# Patient Record
Sex: FEMALE | Race: BLACK OR AFRICAN AMERICAN | Employment: FULL TIME | URBAN - METROPOLITAN AREA
[De-identification: names, ages, dates, MRNs, and addresses within clinical notes are randomized per-mention and may not be internally consistent; named-entity substitution may affect disease eponyms.]

---

## 2019-04-30 ENCOUNTER — APPOINTMENT (OUTPATIENT)
Dept: ULTRASOUND IMAGING | Age: 34
End: 2019-04-30
Payer: COMMERCIAL

## 2019-04-30 ENCOUNTER — APPOINTMENT (OUTPATIENT)
Dept: GENERAL RADIOLOGY | Age: 34
End: 2019-04-30
Payer: COMMERCIAL

## 2019-04-30 ENCOUNTER — HOSPITAL ENCOUNTER (EMERGENCY)
Age: 34
Discharge: HOME | End: 2019-04-30
Payer: COMMERCIAL

## 2019-04-30 VITALS
OXYGEN SATURATION: 98 % | HEART RATE: 77 BPM | TEMPERATURE: 98.3 F | WEIGHT: 280 LBS | RESPIRATION RATE: 18 BRPM | BODY MASS INDEX: 48.06 KG/M2 | SYSTOLIC BLOOD PRESSURE: 134 MMHG | DIASTOLIC BLOOD PRESSURE: 77 MMHG

## 2019-04-30 DIAGNOSIS — R21 RASH AND OTHER NONSPECIFIC SKIN ERUPTION: ICD-10-CM

## 2019-04-30 DIAGNOSIS — M79.662 PAIN AND SWELLING OF LEFT LOWER LEG: Primary | ICD-10-CM

## 2019-04-30 DIAGNOSIS — M79.89 PAIN AND SWELLING OF LEFT LOWER LEG: Primary | ICD-10-CM

## 2019-04-30 PROCEDURE — 99284 EMERGENCY DEPT VISIT MOD MDM: CPT

## 2019-04-30 PROCEDURE — 93971 EXTREMITY STUDY: CPT

## 2019-04-30 PROCEDURE — 73610 X-RAY EXAM OF ANKLE: CPT

## 2019-04-30 ASSESSMENT — PAIN DESCRIPTION - LOCATION: LOCATION: ANKLE

## 2019-04-30 ASSESSMENT — PAIN SCALES - GENERAL: PAINLEVEL_OUTOF10: 3

## 2019-04-30 ASSESSMENT — PAIN DESCRIPTION - PAIN TYPE: TYPE: ACUTE PAIN

## 2019-04-30 ASSESSMENT — PAIN DESCRIPTION - ORIENTATION: ORIENTATION: LEFT

## 2019-04-30 NOTE — ED PROVIDER NOTES
1 tablet by mouth 3 times daily as needed for Muscle spasms 15 tablet 0    naproxen (NAPROSYN) 500 MG tablet Take 1 tablet by mouth 2 times daily as needed for Pain 20 tablet 0       ALLERGIES    No Known Allergies    SOCIAL & FAMILY HISTORY    Social History     Socioeconomic History    Marital status:      Spouse name: None    Number of children: None    Years of education: None    Highest education level: None   Occupational History    None   Social Needs    Financial resource strain: None    Food insecurity:     Worry: None     Inability: None    Transportation needs:     Medical: None     Non-medical: None   Tobacco Use    Smoking status: Never Smoker   Substance and Sexual Activity    Alcohol use: Yes     Alcohol/week: 0.0 oz     Types: 7 - 10 Cans of beer per week     Comment: occasionally    Drug use: No    Sexual activity: None   Lifestyle    Physical activity:     Days per week: None     Minutes per session: None    Stress: None   Relationships    Social connections:     Talks on phone: None     Gets together: None     Attends Judaism service: None     Active member of club or organization: None     Attends meetings of clubs or organizations: None     Relationship status: None    Intimate partner violence:     Fear of current or ex partner: None     Emotionally abused: None     Physically abused: None     Forced sexual activity: None   Other Topics Concern    None   Social History Narrative    None     History reviewed. No pertinent family history. PHYSICAL EXAM    VITAL SIGNS: /77   Pulse 77   Temp 98.3 °F (36.8 °C)   Resp 18   Wt 280 lb (127 kg)   LMP 04/10/2019   SpO2 98%   BMI 48.06 kg/m²   Constitutional:  Well developed, appears comfortable  HENT:  Atraumatic  Respiratory:  Nonlabored breathing  Musculoskeletal: The Left ankle and distal left lower leg demonstrates generalized soft tissue swelling. No gross deformity. + rash- see integumentary.  There is mild diffuse tenderness to palpation just superior to the malleoli without any point tenderness present. No tenderness palpation of her malleoli Full active range of motion of left ankle without pain. The ankle joint is stable. Achilles tendon clinically intact. Affected extremity with 5/5 strength and distally neurovascularly intact. No swelling, discoloration, or tenderness to palpation of the mid to proximal lower left leg bones, foot bones/toes. + TTP of distal left lower leg. No left calf or thigh tenderness to palpation. No left fibular head tenderness. Compartments are soft in left lower extremity. Vascular:  DP pulse 2+, capillary refill intact. Integument:  No open wounds. Macular erythematous rash present in linear streaks over medial aspect of left ankle that does not kimberley. No streaking erythema. No petechiae or purpura. No induration. No crepitus. No bullae or vesicles. Negative Nikolsky sign. Neurologic:  Awake alert, no slurred speech. Strength 5 out of 5 in left lower actually. Left lower extremity is distally neurovascularly intact. RADIOLOGY   VL DUP LOWER EXTREMITY VENOUS LEFT   Final Result   No evidence of DVT in the left lower extremity. XR ANKLE LEFT (MIN 3 VIEWS)   Final Result   1. Nonspecific diffuse distal lower leg and ankle edematous changes. 2. Normal left ankle alignment with no acute abnormality. ED COURSE & MEDICAL DECISION MAKING       Vital signs and nursing notes reviewed during ED course. I have independently evaluated this patient. Supervising physician present in the Emergency Department, available for consultation, throughout entirety of patient care. All pertinent Lab data and radiographic results reviewed with patient at bedside. Patient presents with rash of left ankle region as well as pain and swelling of left lower leg.  While in the ED today left ankle x-ray was obtained and reveals nonspecific diffuse distal lower leg and ankle edematous changes without acute osseous abnormality. Venous duplex ultrasound obtained today to rule out DVT and reveals no evidence of DVT in the left lower extremity. Patient's left flexion is distally neurovascularly intact and compartments are soft in this extremity. Patient reports that she has been standing a lot frequently for work many hours in a row and is concerned this could be causing swelling. This could be dependent edema present. Unsure of exact etiology of rash but rash does not appear consistent with fungal infection, bacterial infection, tinea, SJS, TEN. Rash could be a plant dermatitis. Will trial steroids for rash. Patient discharged home with prescriptions for compression stockings which we discussed usage of today, and hydrocortisone-we discussed medication. I recommended rest and elevation to patient. Affected extremity is distally neurovascularly intact on exam today and I have low clinical suspicion for vascular or nerve injury on exam today. Patient is nontoxic appearing. Vital signs are stable. Patient is stable for outpatient management. Patient comfortable with discharge at this time. The patient and/or the family were informed of the results of any tests/labs/imaging, the treatment plan, and time was allotted to answer questions. Diagnosis and plan discussed in detail with patient who understands and agrees. Patient is comfortable with discharge at this time. Patient understands and agrees to follow up with PCP in 2-3 days for recheck, and follow up with dermatologist in 5-7 days for recheck if symptoms do not improve in that time. Patient understands and agrees to return emergency department if symptoms worsen or any new symptoms develop. Clinical  IMPRESSION    1. Pain and swelling of left lower leg    2.  Rash and other nonspecific skin eruption         Disposition referral (if applicable):  Kasi Martinez MD  [id number]

## 2021-03-30 ENCOUNTER — HOSPITAL ENCOUNTER (OUTPATIENT)
Age: 36
Discharge: HOME | End: 2021-03-30
Payer: COMMERCIAL

## 2021-03-30 ENCOUNTER — HOSPITAL ENCOUNTER (OUTPATIENT)
Dept: GENERAL RADIOLOGY | Age: 36
Discharge: HOME | End: 2021-03-30
Payer: COMMERCIAL

## 2021-03-30 DIAGNOSIS — J18.9 PNEUMONIA OF LEFT LUNG DUE TO INFECTIOUS ORGANISM, UNSPECIFIED PART OF LUNG: ICD-10-CM

## 2021-03-30 DIAGNOSIS — J18.9 PNEUMONIA OF LEFT LUNG DUE TO INFECTIOUS ORGANISM, UNSPECIFIED PART OF LUNG: Primary | ICD-10-CM

## 2021-03-30 PROCEDURE — 71046 X-RAY EXAM CHEST 2 VIEWS: CPT

## 2023-03-25 ENCOUNTER — HOSPITAL ENCOUNTER (EMERGENCY)
Age: 38
Discharge: HOME | End: 2023-03-25
Attending: EMERGENCY MEDICINE
Payer: COMMERCIAL

## 2023-03-25 VITALS
WEIGHT: 290 LBS | HEART RATE: 101 BPM | BODY MASS INDEX: 49.51 KG/M2 | RESPIRATION RATE: 18 BRPM | SYSTOLIC BLOOD PRESSURE: 129 MMHG | TEMPERATURE: 98 F | HEIGHT: 64 IN | DIASTOLIC BLOOD PRESSURE: 97 MMHG | OXYGEN SATURATION: 98 %

## 2023-03-25 DIAGNOSIS — M54.50 RIGHT-SIDED LOW BACK PAIN WITHOUT SCIATICA, UNSPECIFIED CHRONICITY: Primary | ICD-10-CM

## 2023-03-25 DIAGNOSIS — M25.551 RIGHT HIP PAIN: ICD-10-CM

## 2023-03-25 PROCEDURE — 99283 EMERGENCY DEPT VISIT LOW MDM: CPT

## 2023-03-25 RX ORDER — METHOCARBAMOL 500 MG/1
500 TABLET, FILM COATED ORAL 4 TIMES DAILY
Qty: 40 TABLET | Refills: 0 | Status: SHIPPED | OUTPATIENT
Start: 2023-03-25 | End: 2023-04-04

## 2023-03-25 RX ORDER — LIDOCAINE 50 MG/G
1 PATCH TOPICAL DAILY
Qty: 30 PATCH | Refills: 0 | Status: SHIPPED | OUTPATIENT
Start: 2023-03-25 | End: 2023-04-24

## 2023-03-25 RX ORDER — NAPROXEN 500 MG/1
500 TABLET ORAL 2 TIMES DAILY PRN
Qty: 20 TABLET | Refills: 0 | Status: SHIPPED | OUTPATIENT
Start: 2023-03-25 | End: 2023-04-04

## 2023-03-25 ASSESSMENT — PAIN DESCRIPTION - DESCRIPTORS: DESCRIPTORS: ACHING

## 2023-03-25 ASSESSMENT — PAIN DESCRIPTION - FREQUENCY: FREQUENCY: CONTINUOUS

## 2023-03-25 ASSESSMENT — PAIN DESCRIPTION - ONSET: ONSET: GRADUAL

## 2023-03-25 ASSESSMENT — PAIN DESCRIPTION - ORIENTATION: ORIENTATION: RIGHT

## 2023-03-25 ASSESSMENT — PAIN DESCRIPTION - PAIN TYPE: TYPE: ACUTE PAIN

## 2023-03-25 ASSESSMENT — PAIN DESCRIPTION - LOCATION: LOCATION: HIP

## 2023-03-25 ASSESSMENT — PAIN SCALES - GENERAL: PAINLEVEL_OUTOF10: 6

## 2023-03-25 NOTE — ED PROVIDER NOTES
Emergency Department Encounter    Patient: Indra Herrera  MRN: [id number]  : 1985  Date of Evaluation: 3/25/2023  ED Provider:  River Chua MD    Triage Chief Complaint:   Leg Pain (Right leg pain )    [place]:  Indra Herrera is a 40 y.o. female that presents with complaint of right leg pain. Had a fall down stairs last year,had similar pain and then it did get better. 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 Does heavy lifting/pushing at work a lot. Hurts primarily at the hip and soreness over the upper thigh. Better with activity, worse with rest. Ibuprofen and advil help a lot for it. Able to walk around. Worse at night. No weakness or numbness in lower extremities. Pain is fairly continuous when she is resting. ROS - see HPI, below listed is current ROS at time of my eval:  4 systems reviewed and negative except as above. No past medical history on file. Past Surgical History:   Procedure Laterality Date    TUBAL LIGATION       No family history on file. Social History     Socioeconomic History    Marital status:      Spouse name: Not on file    Number of children: Not on file    Years of education: Not on file    Highest education level: Not on file   Occupational History    Not on file   Tobacco Use    Smoking status: Never    Smokeless tobacco: Not on file   Substance and Sexual Activity    Alcohol use:  Yes     Alcohol/week: 0.0 standard drinks     Types: 7 - 10 Cans of beer per week     Comment: occasionally    Drug use: No    Sexual activity: Not on file   Other Topics Concern    Not on file   Social History Narrative    Not on file     Social Determinants of Health     Financial Resource Strain: Not on file   Food Insecurity: Not on file   Transportation Needs: Not on file   Physical Activity: Not on file   Stress: Not on file   Social Connections: Not on file   Intimate Partner Violence: Not on file   Housing Stability: Not on file     No current facility-administered medications for this encounter. Current Outpatient Medications   Medication Sig Dispense Refill    methocarbamol (ROBAXIN) 500 MG tablet Take 1 tablet by mouth 4 times daily for 10 days 40 tablet 0    naproxen (NAPROSYN) 500 MG tablet Take 1 tablet by mouth 2 times daily as needed for Pain 20 tablet 0    lidocaine (LIDODERM) 5 % Place 1 patch onto the skin daily 12 hours on, 12 hours off. 30 patch 0    Compression Stockings MISC Please dispense one pair of compression stockings to be worn as needed throughout the day for lower extremity swelling. Remove each night before bed. 1 each 0    hydrocortisone 2.5 % cream Apply topically to rash 3 times daily for no longer than 3 weeks. 1 Tube 0    cyclobenzaprine (FLEXERIL) 5 MG tablet Take 1 tablet by mouth 3 times daily as needed for Muscle spasms 15 tablet 0     No Known Allergies    Nursing Notes Reviewed    Physical Exam:  Triage VS:    ED Triage Vitals [03/25/23 0725]   Enc Vitals Group      BP (!) 129/97      Heart Rate (!) 101      Resp 18      Temp 98 °F (36.7 °C)      Temp Source Oral      SpO2 98 %      Weight       Height       Head Circumference       Peak Flow       Pain Score       Pain Loc       Pain Edu? Excl. in [address]? My pulse ox interpretation is - normal    General appearance:  No acute distress. Skin:  Warm. Dry. No rashes or erythema or swelling noted over right low back, hip, thigh or leg. Eye:  Extraocular movements intact. Ears, nose, mouth and throat:  Oral mucosa moist   Neck:  Trachea midline. Extremity:  No swelling over entire right leg and joints. No calf tenderness. Normal ROM at the right hip, knee and ankle. Mild pain with flexing and internally/externally rotating the hip. But minimal tenderness to palpation, more tender to palpation over right lumbar paraspinal muscles. Actually jumped when I was palpating over lower back and piriformis.  No inguinal

## 2024-06-24 ENCOUNTER — OFFICE VISIT MH/BH (OUTPATIENT)
Dept: BARIATRICS/WEIGHT MGMT | Age: 39
End: 2024-06-24
Payer: COMMERCIAL

## 2024-06-24 VITALS
WEIGHT: 293 LBS | BODY MASS INDEX: 53.92 KG/M2 | HEIGHT: 62 IN | HEART RATE: 86 BPM | DIASTOLIC BLOOD PRESSURE: 72 MMHG | SYSTOLIC BLOOD PRESSURE: 120 MMHG | OXYGEN SATURATION: 98 %

## 2024-06-24 DIAGNOSIS — E66.01 MORBID OBESITY WITH BMI OF 50.0-59.9, ADULT (HCC): Primary | ICD-10-CM

## 2024-06-24 DIAGNOSIS — Z79.899 MEDICATION MANAGEMENT: ICD-10-CM

## 2024-06-24 LAB
ALCOHOL URINE: NORMAL
AMPHETAMINE SCREEN, URINE: NEGATIVE
BARBITURATE SCREEN, URINE: NORMAL
BENZODIAZEPINE SCREEN, URINE: NEGATIVE
BUPRENORPHINE URINE: NEGATIVE
COCAINE METABOLITE SCREEN URINE: NEGATIVE
FENTANYL SCREEN, URINE: NORMAL
GABAPENTIN SCREEN, URINE: NORMAL
MDMA URINE: NEGATIVE
METHADONE SCREEN, URINE: NEGATIVE
METHAMPHETAMINE, URINE: NEGATIVE
OPIATE SCREEN URINE: NEGATIVE
OXYCODONE SCREEN URINE: NEGATIVE
PHENCYCLIDINE SCREEN URINE: NEGATIVE
PROPOXYPHENE SCREEN, URINE: NEGATIVE
SYNTHETIC CANNABINOIDS(K2) SCREEN, URINE: NORMAL
THC SCREEN, URINE: NEGATIVE
TRAMADOL SCREEN URINE: NORMAL
TRICYCLIC ANTIDEPRESSANTS, UR: NEGATIVE

## 2024-06-24 PROCEDURE — 99204 OFFICE O/P NEW MOD 45 MIN: CPT | Performed by: NURSE PRACTITIONER

## 2024-06-24 PROCEDURE — G8427 DOCREV CUR MEDS BY ELIG CLIN: HCPCS | Performed by: NURSE PRACTITIONER

## 2024-06-24 PROCEDURE — 1036F TOBACCO NON-USER: CPT | Performed by: NURSE PRACTITIONER

## 2024-06-24 PROCEDURE — 80305 DRUG TEST PRSMV DIR OPT OBS: CPT | Performed by: NURSE PRACTITIONER

## 2024-06-24 PROCEDURE — G8417 CALC BMI ABV UP PARAM F/U: HCPCS | Performed by: NURSE PRACTITIONER

## 2024-06-24 PROCEDURE — 36415 COLL VENOUS BLD VENIPUNCTURE: CPT | Performed by: NURSE PRACTITIONER

## 2024-06-24 NOTE — PROGRESS NOTES
Chief Complaint   Patient presents with   • Weight Management     NP - 1st Med WM Visit         SUBJECTIVE:    HPI: Patient is here with complaints of weight gain and inability to lose weight per self.  Inquiring about weight loss medications to assist with their weight loss goal.    Obesity with a BMI of Body mass index is 55.88 kg/m²..    Patient has failed to lose 5% of body weight for many years.    Any history of glaucoma? DENIES    Any history of drug abuse? DENIES    Any history of CAD, uncontrolled HTN, arrhythmias, stroke, or CHF?? DENIES    Any history of hyperthyroidism? Denies    Any current or recent use of MAOIs? DENIES    Pregnant or breastfeeding?   tubal ligation    Current dietary measures: denies a diet plan     Current exercise measures: limited     I have reviewed the patient's(pertinent information to this visit) medical history, family history(scanned in  the Mediatab under \"patient questioner\"), social history and review of systems with the patient today in the office.          Past Surgical History:   Procedure Laterality Date   • TUBAL LIGATION         History reviewed. No pertinent past medical history.    History reviewed. No pertinent family history.    Social History     Socioeconomic History   • Marital status:      Spouse name: Not on file   • Number of children: Not on file   • Years of education: Not on file   • Highest education level: Not on file   Occupational History   • Not on file   Tobacco Use   • Smoking status: Never   • Smokeless tobacco: Not on file   Substance and Sexual Activity   • Alcohol use: Yes     Alcohol/week: 0.0 standard drinks of alcohol     Types: 7 - 10 Cans of beer per week     Comment: occasionally   • Drug use: No   • Sexual activity: Not on file   Other Topics Concern   • Not on file   Social History Narrative   • Not on file     Social Determinants of Health     Financial Resource Strain: Not on file   Food Insecurity: Not on file

## 2024-06-25 LAB
ALBUMIN SERPL-MCNC: 4.1 G/DL (ref 3.4–5)
ALBUMIN/GLOB SERPL: 1.2 {RATIO} (ref 1.1–2.2)
ALP SERPL-CCNC: 82 U/L (ref 40–129)
ALT SERPL-CCNC: 15 U/L (ref 10–40)
ANION GAP SERPL CALCULATED.3IONS-SCNC: 13 MMOL/L (ref 3–16)
AST SERPL-CCNC: 13 U/L (ref 15–37)
BASOPHILS # BLD: 0 K/UL (ref 0–0.2)
BASOPHILS NFR BLD: 0.3 %
BILIRUB SERPL-MCNC: <0.2 MG/DL (ref 0–1)
BUN SERPL-MCNC: 12 MG/DL (ref 7–20)
CALCIUM SERPL-MCNC: 9.1 MG/DL (ref 8.3–10.6)
CHLORIDE SERPL-SCNC: 104 MMOL/L (ref 99–110)
CO2 SERPL-SCNC: 24 MMOL/L (ref 21–32)
CREAT SERPL-MCNC: 0.7 MG/DL (ref 0.6–1.1)
DEPRECATED RDW RBC AUTO: 14.3 % (ref 12.4–15.4)
EOSINOPHIL # BLD: 0.2 K/UL (ref 0–0.6)
EOSINOPHIL NFR BLD: 2.3 %
EST. AVERAGE GLUCOSE BLD GHB EST-MCNC: 108.3 MG/DL
GFR SERPLBLD CREATININE-BSD FMLA CKD-EPI: >90 ML/MIN/{1.73_M2}
GLUCOSE SERPL-MCNC: 76 MG/DL (ref 70–99)
HBA1C MFR BLD: 5.4 %
HCT VFR BLD AUTO: 37.1 % (ref 36–48)
HGB BLD-MCNC: 12.5 G/DL (ref 12–16)
LYMPHOCYTES # BLD: 2.5 K/UL (ref 1–5.1)
LYMPHOCYTES NFR BLD: 30.2 %
MCH RBC QN AUTO: 28 PG (ref 26–34)
MCHC RBC AUTO-ENTMCNC: 33.6 G/DL (ref 31–36)
MCV RBC AUTO: 83.2 FL (ref 80–100)
MONOCYTES # BLD: 0.4 K/UL (ref 0–1.3)
MONOCYTES NFR BLD: 4.9 %
NEUTROPHILS # BLD: 5.2 K/UL (ref 1.7–7.7)
NEUTROPHILS NFR BLD: 62.3 %
PLATELET # BLD AUTO: 319 K/UL (ref 135–450)
PMV BLD AUTO: 9 FL (ref 5–10.5)
POTASSIUM SERPL-SCNC: 3.6 MMOL/L (ref 3.5–5.1)
PROT SERPL-MCNC: 7.5 G/DL (ref 6.4–8.2)
RBC # BLD AUTO: 4.46 M/UL (ref 4–5.2)
SODIUM SERPL-SCNC: 141 MMOL/L (ref 136–145)
WBC # BLD AUTO: 8.4 K/UL (ref 4–11)

## 2024-07-08 ENCOUNTER — OFFICE VISIT (OUTPATIENT)
Dept: BARIATRICS/WEIGHT MGMT | Age: 39
End: 2024-07-08
Payer: COMMERCIAL

## 2024-07-08 VITALS
HEIGHT: 62 IN | DIASTOLIC BLOOD PRESSURE: 86 MMHG | WEIGHT: 293 LBS | BODY MASS INDEX: 53.92 KG/M2 | HEART RATE: 97 BPM | SYSTOLIC BLOOD PRESSURE: 104 MMHG | OXYGEN SATURATION: 99 %

## 2024-07-08 DIAGNOSIS — E66.01 MORBID OBESITY WITH BMI OF 50.0-59.9, ADULT (HCC): Primary | ICD-10-CM

## 2024-07-08 DIAGNOSIS — Z79.899 MEDICATION MANAGEMENT: ICD-10-CM

## 2024-07-08 PROCEDURE — G8427 DOCREV CUR MEDS BY ELIG CLIN: HCPCS | Performed by: NURSE PRACTITIONER

## 2024-07-08 PROCEDURE — 1036F TOBACCO NON-USER: CPT | Performed by: NURSE PRACTITIONER

## 2024-07-08 PROCEDURE — G8417 CALC BMI ABV UP PARAM F/U: HCPCS | Performed by: NURSE PRACTITIONER

## 2024-07-08 PROCEDURE — 99214 OFFICE O/P EST MOD 30 MIN: CPT | Performed by: NURSE PRACTITIONER

## 2024-07-08 RX ORDER — PHENTERMINE HYDROCHLORIDE 37.5 MG/1
37.5 TABLET ORAL
Qty: 30 TABLET | Refills: 0 | Status: SHIPPED | OUTPATIENT
Start: 2024-07-08 | End: 2024-08-07

## 2024-07-08 NOTE — PROGRESS NOTES
Chief Complaint   Patient presents with    Weight Management     Med WM - Planned Adipex         SUBJECTIVE:    HPI: Patient is here with complaints of weight gain and inability to lose weight per self.  Inquiring about weight loss medications to assist with their weight loss goal.    Obesity with a BMI of Body mass index is 56.23 kg/m²..    Any history of glaucoma? Denies    Any history of drug abuse? Denies    Any history of CAD, uncontrolled HTN, arrhythmias, stroke, or CHF??  Denies    Any history of hyperthyroidism? Denies    Any current or recent use of MAOIs? Denies    Current dietary measures: has not started tracking calories yet    Current exercise measures: limited    I have reviewed the patient's(pertinent information to this visit) medical history, family history(scanned in  the Mediat[employer] under \"patient questioner\"), social history and review of systems with the patient today in the office.          Past Surgical History:   Procedure Laterality Date    TUBAL LIGATION         History reviewed. No pertinent past medical history.    History reviewed. No pertinent family history.    Social History     Socioeconomic History    Marital status:      Spouse name: Not on file    Number of children: Not on file    Years of education: Not on file    Highest education level: Not on file   Occupational History    Not on file   Tobacco Use    Smoking status: Never    Smokeless tobacco: Not on file   Substance and Sexual Activity    Alcohol use: Yes     Alcohol/week: 0.0 standard drinks of alcohol     Types: 7 - 10 Cans of beer per week     Comment: occasionally    Drug use: No    Sexual activity: Not on file   Other Topics Concern    Not on file   Social History Narrative    Not on file     Social Determinants of Health     Financial Resource Strain: Not on file   Food Insecurity: Not on file   Transportation Needs: Not on file   Physical Activity: Not on file   Stress: Not on file   Social Connections: Not on

## 2024-07-31 ENCOUNTER — OFFICE VISIT (OUTPATIENT)
Dept: BARIATRICS/WEIGHT MGMT | Age: 39
End: 2024-07-31

## 2024-07-31 VITALS — WEIGHT: 293 LBS | BODY MASS INDEX: 53.92 KG/M2 | HEIGHT: 62 IN

## 2024-07-31 DIAGNOSIS — E66.01 MORBID OBESITY WITH BMI OF 50.0-59.9, ADULT (HCC): Primary | ICD-10-CM

## 2024-07-31 NOTE — PROGRESS NOTES
Outpatient Nutrition Counseling - Medication Weight Loss Program    REASON FOR VISIT: Medication Program    Chief Complaint:    Chief Complaint   Patient presents with    Weight Management       SUBJECTIVE:  Pt here for nutrition consult in medication program. The patient is a 38 y.o. female being seen for obesity, enrolled in Medication Weight Loss Program; [facility], Height: 158.1 cm (5' 2.25\"), Weight - Scale: 135.4 kg (298 lb 6.4 oz), Current Body mass index is 54.14 kg/m².  patient's PCP is Judd Gilbert DO     Comorbid Conditions:  Significant diseases affecting this patient are No past medical history on file..    Review of Systems - Review of Systems  Otherwise per HPI.    Allergies:  No Known Allergies    Past Surgical History:  Past Surgical History:   Procedure Laterality Date    TUBAL LIGATION         Family History:  No family history on file.    Social History:  Social History     Socioeconomic History    Marital status:      Spouse name: Not on file    Number of children: Not on file    Years of education: Not on file    Highest education level: Not on file   Occupational History    Not on file   Tobacco Use    Smoking status: Never    Smokeless tobacco: Not on file   Substance and Sexual Activity    Alcohol use: Yes     Alcohol/week: 0.0 standard drinks of alcohol     Types: 7 - 10 Cans of beer per week     Comment: occasionally    Drug use: No    Sexual activity: Not on file   Other Topics Concern    Not on file   Social History Narrative    Not on file     Social Determinants of Health     Financial Resource Strain: Not on file   Food Insecurity: Not on file   Transportation Needs: Not on file   Physical Activity: Not on file   Stress: Not on file   Social Connections: Not on file   Intimate Partner Violence: Not on file   Housing Stability: Not on file         OBJECTIVE:  Physical Exam   Ht 1.581 m (5' 2.25\")   Wt 135.4 kg (298 lb 6.4 oz)   BMI 54.14 kg/m²        NUTRITION

## 2024-08-07 ENCOUNTER — OFFICE VISIT (OUTPATIENT)
Dept: BARIATRICS/WEIGHT MGMT | Age: 39
End: 2024-08-07
Payer: COMMERCIAL

## 2024-08-07 VITALS
HEIGHT: 62 IN | BODY MASS INDEX: 53.92 KG/M2 | OXYGEN SATURATION: 98 % | HEART RATE: 90 BPM | DIASTOLIC BLOOD PRESSURE: 60 MMHG | SYSTOLIC BLOOD PRESSURE: 112 MMHG | WEIGHT: 293 LBS

## 2024-08-07 DIAGNOSIS — Z79.899 MEDICATION MANAGEMENT: ICD-10-CM

## 2024-08-07 DIAGNOSIS — E66.01 MORBID OBESITY WITH BMI OF 50.0-59.9, ADULT (HCC): Primary | ICD-10-CM

## 2024-08-07 PROCEDURE — G8417 CALC BMI ABV UP PARAM F/U: HCPCS | Performed by: NURSE PRACTITIONER

## 2024-08-07 PROCEDURE — G8427 DOCREV CUR MEDS BY ELIG CLIN: HCPCS | Performed by: NURSE PRACTITIONER

## 2024-08-07 PROCEDURE — 1036F TOBACCO NON-USER: CPT | Performed by: NURSE PRACTITIONER

## 2024-08-07 PROCEDURE — 99214 OFFICE O/P EST MOD 30 MIN: CPT | Performed by: NURSE PRACTITIONER

## 2024-08-07 RX ORDER — PHENTERMINE HYDROCHLORIDE 37.5 MG/1
37.5 TABLET ORAL
Qty: 30 TABLET | Refills: 0 | Status: SHIPPED | OUTPATIENT
Start: 2024-08-07 | End: 2024-09-06

## 2024-08-07 NOTE — PROGRESS NOTES
Chief Complaint   Patient presents with    Weight Management     F/U Medication WM Visit - Adipex #2  Has Bar Cov         SUBJECTIVE:    HPI: Patient is here with complaints of: Monthly Adipex visit.    Obesity with a BMI of Body mass index is 54.23 kg/m²..    Current weight: 298 pounds    Weight change since last visit: -11 pounds (if pt failed to lose weight, cannot remain on medication).    Month 2 of being on Adipex.     Any new absolute contraindications (glaucoma, drug abuse, CAD, uncontrolled HTN, arrhythmias, stroke, CHF, hyperthyroidism, MAOI use, pregnant or breastfeeding) to being on medication: DENIES     Pt complains of the following side effects: DENIES    Current dietary measures: portion control and healthier food options    Current exercise measures: walking 1-2 miles daily    I have reviewed the patient's(pertinent information to this visit) medical history, family history(scanned in  the Mediatab under \"patient questioner\"), social history and review of systems with the patient today in the office.          Past Surgical History:   Procedure Laterality Date    TUBAL LIGATION         History reviewed. No pertinent past medical history.    History reviewed. No pertinent family history.    Social History     Socioeconomic History    Marital status:      Spouse name: Not on file    Number of children: Not on file    Years of education: Not on file    Highest education level: Not on file   Occupational History    Not on file   Tobacco Use    Smoking status: Never    Smokeless tobacco: Not on file   Substance and Sexual Activity    Alcohol use: Yes     Alcohol/week: 0.0 standard drinks of alcohol     Types: 7 - 10 Cans of beer per week     Comment: occasionally    Drug use: No    Sexual activity: Not on file   Other Topics Concern    Not on file   Social History Narrative    Not on file     Social Determinants of Health     Financial Resource Strain: Not on file   Food Insecurity: Not on file

## 2024-09-09 ENCOUNTER — OFFICE VISIT (OUTPATIENT)
Dept: BARIATRICS/WEIGHT MGMT | Age: 39
End: 2024-09-09
Payer: COMMERCIAL

## 2024-09-09 VITALS
HEIGHT: 62 IN | HEART RATE: 94 BPM | SYSTOLIC BLOOD PRESSURE: 110 MMHG | DIASTOLIC BLOOD PRESSURE: 78 MMHG | WEIGHT: 287.5 LBS | OXYGEN SATURATION: 96 % | BODY MASS INDEX: 52.91 KG/M2

## 2024-09-09 DIAGNOSIS — Z79.899 MEDICATION MANAGEMENT: ICD-10-CM

## 2024-09-09 DIAGNOSIS — E66.01 MORBID OBESITY WITH BMI OF 50.0-59.9, ADULT (HCC): Primary | ICD-10-CM

## 2024-09-09 PROCEDURE — 1036F TOBACCO NON-USER: CPT | Performed by: NURSE PRACTITIONER

## 2024-09-09 PROCEDURE — 99214 OFFICE O/P EST MOD 30 MIN: CPT | Performed by: NURSE PRACTITIONER

## 2024-09-09 PROCEDURE — G8417 CALC BMI ABV UP PARAM F/U: HCPCS | Performed by: NURSE PRACTITIONER

## 2024-09-09 PROCEDURE — G8427 DOCREV CUR MEDS BY ELIG CLIN: HCPCS | Performed by: NURSE PRACTITIONER

## 2024-09-09 RX ORDER — PHENTERMINE HYDROCHLORIDE 37.5 MG/1
37.5 TABLET ORAL
Qty: 30 TABLET | Refills: 0 | Status: SHIPPED | OUTPATIENT
Start: 2024-09-09 | End: 2024-10-09

## 2024-09-09 RX ORDER — SENNOSIDES A AND B 8.6 MG/1
1 TABLET, FILM COATED ORAL 2 TIMES DAILY
Qty: 60 TABLET | Refills: 11 | Status: SHIPPED | OUTPATIENT
Start: 2024-09-09 | End: 2025-09-09

## 2024-09-09 ASSESSMENT — ENCOUNTER SYMPTOMS: CONSTIPATION: 1

## 2024-10-07 ENCOUNTER — OFFICE VISIT (OUTPATIENT)
Dept: BARIATRICS/WEIGHT MGMT | Age: 39
End: 2024-10-07
Payer: COMMERCIAL

## 2024-10-07 VITALS
SYSTOLIC BLOOD PRESSURE: 112 MMHG | HEART RATE: 88 BPM | BODY MASS INDEX: 52.56 KG/M2 | OXYGEN SATURATION: 98 % | WEIGHT: 285.6 LBS | HEIGHT: 62 IN | DIASTOLIC BLOOD PRESSURE: 78 MMHG

## 2024-10-07 DIAGNOSIS — Z79.899 MEDICATION MANAGEMENT: ICD-10-CM

## 2024-10-07 DIAGNOSIS — E66.01 MORBID OBESITY WITH BMI OF 50.0-59.9, ADULT: Primary | ICD-10-CM

## 2024-10-07 PROCEDURE — G8427 DOCREV CUR MEDS BY ELIG CLIN: HCPCS | Performed by: NURSE PRACTITIONER

## 2024-10-07 PROCEDURE — 99214 OFFICE O/P EST MOD 30 MIN: CPT | Performed by: NURSE PRACTITIONER

## 2024-10-07 PROCEDURE — G8417 CALC BMI ABV UP PARAM F/U: HCPCS | Performed by: NURSE PRACTITIONER

## 2024-10-07 PROCEDURE — 1036F TOBACCO NON-USER: CPT | Performed by: NURSE PRACTITIONER

## 2024-10-07 PROCEDURE — G8484 FLU IMMUNIZE NO ADMIN: HCPCS | Performed by: NURSE PRACTITIONER

## 2024-10-07 RX ORDER — PHENTERMINE HYDROCHLORIDE 37.5 MG/1
37.5 TABLET ORAL
Qty: 30 TABLET | Refills: 0 | Status: SHIPPED | OUTPATIENT
Start: 2024-10-07 | End: 2024-11-06

## 2024-10-07 NOTE — PROGRESS NOTES
Restart tracking calories; about  0682-7510 daily  - Limit carb intake/ limit convenience foods  - 64 oz water daily  - Increase activity as able    2. Medication management  - Down 1.9 pounds this month  - Denies any side effects  - Has surpassed the 5% weight loss goal  - RX refill sent for 4th month of Adipex  - RTC one month    - phentermine (ADIPEX-P) 37.5 MG tablet; Take 1 tablet by mouth every morning (before breakfast) for 30 days. BMI 44. Max Daily Amount: 37.5 mg  Dispense: 30 tablet; Refill: 0        PLAN:    -Continue Adipex. RX REFILLED.    -BMI is over 30, or over 27 with weight-related risk factors.     -Patient demonstrated weight loss since last visit.    -Patient aware that weight must be lost at each visit or medication will be discontinued.     - Patient must lose 5% of body weight every 3 months to remain on medication.    -Patient is aware that in order to be successful, must combine Adipex with appropriate diet and exercise plan.     -Pt aware must continue to meet monthly in person while on this medication. F/u in one month.    -Check [place] [employer] Rx Reporting System. Any concerns: NONE Reported     - Current birth control measures include: tubal ligation    -If elderly or renal impairment, will use lower dose (15 mg daily) and avoid all together if GFR is less than 15. N/A      No orders of the defined types were placed in this encounter.       Orders Placed This Encounter   Medications    phentermine (ADIPEX-P) 37.5 MG tablet     Sig: Take 1 tablet by mouth every morning (before breakfast) for 30 days. BMI 44. Max Daily Amount: 37.5 mg     Dispense:  30 tablet     Refill:  0        Follow Up:  Return in about 1 month (around 11/7/2024).      ROXANA Veliz - CNP

## 2024-11-07 ENCOUNTER — OFFICE VISIT (OUTPATIENT)
Dept: BARIATRICS/WEIGHT MGMT | Age: 39
End: 2024-11-07
Payer: COMMERCIAL

## 2024-11-07 VITALS
WEIGHT: 273.9 LBS | OXYGEN SATURATION: 99 % | HEART RATE: 94 BPM | DIASTOLIC BLOOD PRESSURE: 84 MMHG | SYSTOLIC BLOOD PRESSURE: 136 MMHG | HEIGHT: 62 IN | BODY MASS INDEX: 50.4 KG/M2

## 2024-11-07 DIAGNOSIS — Z79.899 MEDICATION MANAGEMENT: ICD-10-CM

## 2024-11-07 DIAGNOSIS — E66.01 MORBID OBESITY WITH BMI OF 45.0-49.9, ADULT: Primary | ICD-10-CM

## 2024-11-07 PROCEDURE — 99214 OFFICE O/P EST MOD 30 MIN: CPT | Performed by: NURSE PRACTITIONER

## 2024-11-07 PROCEDURE — G8417 CALC BMI ABV UP PARAM F/U: HCPCS | Performed by: NURSE PRACTITIONER

## 2024-11-07 PROCEDURE — 1036F TOBACCO NON-USER: CPT | Performed by: NURSE PRACTITIONER

## 2024-11-07 PROCEDURE — G8484 FLU IMMUNIZE NO ADMIN: HCPCS | Performed by: NURSE PRACTITIONER

## 2024-11-07 PROCEDURE — G8427 DOCREV CUR MEDS BY ELIG CLIN: HCPCS | Performed by: NURSE PRACTITIONER

## 2024-11-07 RX ORDER — PHENTERMINE HYDROCHLORIDE 37.5 MG/1
37.5 TABLET ORAL
Qty: 30 TABLET | Refills: 0 | Status: SHIPPED | OUTPATIENT
Start: 2024-11-07 | End: 2024-12-07

## 2024-11-07 ASSESSMENT — PATIENT HEALTH QUESTIONNAIRE - PHQ9
2. FEELING DOWN, DEPRESSED OR HOPELESS: NOT AT ALL
SUM OF ALL RESPONSES TO PHQ QUESTIONS 1-9: 0
1. LITTLE INTEREST OR PLEASURE IN DOING THINGS: NOT AT ALL
SUM OF ALL RESPONSES TO PHQ QUESTIONS 1-9: 0
SUM OF ALL RESPONSES TO PHQ QUESTIONS 1-9: 0
SUM OF ALL RESPONSES TO PHQ9 QUESTIONS 1 & 2: 0
SUM OF ALL RESPONSES TO PHQ QUESTIONS 1-9: 0

## 2024-11-07 NOTE — PROGRESS NOTES
Chief Complaint   Patient presents with    Weight Management     Med WM Adipex #5 Has Bar cov.         SUBJECTIVE:    HPI: Patient is here with complaints of: Monthly Adipex visit.    Obesity with a BMI of Body mass index is 49.7 kg/m²..    Current weight: 273 pounds    Weight change since last visit: -11.7 pounds (if pt failed to lose weight, cannot remain on medication).    Month 5 of being on Adipex.     Any new absolute contraindications (glaucoma, drug abuse, CAD, uncontrolled HTN, arrhythmias, stroke, CHF, hyperthyroidism, MAOI use, pregnant or breastfeeding) to being on medication: DENIES     Pt complains of the following side effects: DENIES    Current dietary measures: tracking calories about 1400 daily; water intake good; protein intake good    Current exercise measures: walking more     I have reviewed the patient's(pertinent information to this visit) medical history, family history(scanned in  the Mediatab under \"patient questioner\"), social history and review of systems with the patient today in the office.          Past Surgical History:   Procedure Laterality Date    TUBAL LIGATION         History reviewed. No pertinent past medical history.    History reviewed. No pertinent family history.    Social History     Socioeconomic History    Marital status:      Spouse name: Not on file    Number of children: Not on file    Years of education: Not on file    Highest education level: Not on file   Occupational History    Not on file   Tobacco Use    Smoking status: Never    Smokeless tobacco: Not on file   Substance and Sexual Activity    Alcohol use: Yes     Alcohol/week: 0.0 standard drinks of alcohol     Types: 7 - 10 Cans of beer per week     Comment: occasionally    Drug use: No    Sexual activity: Not on file   Other Topics Concern    Not on file   Social History Narrative    Not on file     Social Determinants of Health     Financial Resource Strain: Not on file   Food Insecurity: Not on file

## 2024-12-11 ENCOUNTER — OFFICE VISIT (OUTPATIENT)
Dept: BARIATRICS/WEIGHT MGMT | Age: 39
End: 2024-12-11
Payer: COMMERCIAL

## 2024-12-11 VITALS
HEIGHT: 62 IN | SYSTOLIC BLOOD PRESSURE: 124 MMHG | BODY MASS INDEX: 50.14 KG/M2 | HEART RATE: 87 BPM | DIASTOLIC BLOOD PRESSURE: 60 MMHG | WEIGHT: 272.5 LBS | OXYGEN SATURATION: 95 %

## 2024-12-11 DIAGNOSIS — Z79.899 MEDICATION MANAGEMENT: ICD-10-CM

## 2024-12-11 DIAGNOSIS — E66.01 MORBID OBESITY WITH BMI OF 45.0-49.9, ADULT: Primary | ICD-10-CM

## 2024-12-11 PROCEDURE — G8417 CALC BMI ABV UP PARAM F/U: HCPCS | Performed by: NURSE PRACTITIONER

## 2024-12-11 PROCEDURE — G8484 FLU IMMUNIZE NO ADMIN: HCPCS | Performed by: NURSE PRACTITIONER

## 2024-12-11 PROCEDURE — 1036F TOBACCO NON-USER: CPT | Performed by: NURSE PRACTITIONER

## 2024-12-11 PROCEDURE — G8427 DOCREV CUR MEDS BY ELIG CLIN: HCPCS | Performed by: NURSE PRACTITIONER

## 2024-12-11 PROCEDURE — 99214 OFFICE O/P EST MOD 30 MIN: CPT | Performed by: NURSE PRACTITIONER

## 2024-12-11 RX ORDER — PHENTERMINE HYDROCHLORIDE 37.5 MG/1
37.5 TABLET ORAL
Qty: 30 TABLET | Refills: 0 | Status: SHIPPED | OUTPATIENT
Start: 2024-12-11 | End: 2025-01-10

## 2024-12-11 RX ORDER — NAPROXEN 500 MG/1
500 TABLET ORAL 2 TIMES DAILY
COMMUNITY
Start: 2024-11-09

## 2024-12-11 NOTE — PROGRESS NOTES
Chief Complaint   Patient presents with    Weight Management     Medication WM Visit - Adipex #6         SUBJECTIVE:    HPI: Patient is here with complaints of: Monthly Adipex visit.    Obesity with a BMI of Body mass index is 49.44 kg/m²..    Current weight: 272 pounds    Weight change since last visit: -1.4 pounds (if pt failed to lose weight, cannot remain on medication).    Month 6 of being on Adipex.     Any new absolute contraindications (glaucoma, drug abuse, CAD, uncontrolled HTN, arrhythmias, stroke, CHF, hyperthyroidism, MAOI use, pregnant or breastfeeding) to being on medication: DENIES     Pt complains of the following side effects: DENIES    Current dietary measures: tracking calories most days; more carb intake recently than usual; water intake good    Current exercise measures: limited    I have reviewed the patient's(pertinent information to this visit) medical history, family history(scanned in  the Mediatab under \"patient questioner\"), social history and review of systems with the patient today in the office.          Past Surgical History:   Procedure Laterality Date    TUBAL LIGATION         No past medical history on file.    No family history on file.    Social History     Socioeconomic History    Marital status:      Spouse name: Not on file    Number of children: Not on file    Years of education: Not on file    Highest education level: Not on file   Occupational History    Not on file   Tobacco Use    Smoking status: Never    Smokeless tobacco: Not on file   Substance and Sexual Activity    Alcohol use: Yes     Alcohol/week: 0.0 standard drinks of alcohol     Types: 7 - 10 Cans of beer per week     Comment: occasionally    Drug use: No    Sexual activity: Not on file   Other Topics Concern    Not on file   Social History Narrative    Not on file     Social Determinants of Health     Financial Resource Strain: Not on file   Food Insecurity: Not on file   Transportation Needs: Not on

## 2025-01-15 ENCOUNTER — OFFICE VISIT (OUTPATIENT)
Dept: BARIATRICS/WEIGHT MGMT | Age: 40
End: 2025-01-15
Payer: COMMERCIAL

## 2025-01-15 VITALS
SYSTOLIC BLOOD PRESSURE: 120 MMHG | BODY MASS INDEX: 50.64 KG/M2 | OXYGEN SATURATION: 97 % | HEART RATE: 83 BPM | DIASTOLIC BLOOD PRESSURE: 80 MMHG | HEIGHT: 62 IN | WEIGHT: 275.2 LBS

## 2025-01-15 DIAGNOSIS — Z79.899 MEDICATION MANAGEMENT: ICD-10-CM

## 2025-01-15 DIAGNOSIS — E66.01 MORBID OBESITY WITH BMI OF 45.0-49.9, ADULT: Primary | ICD-10-CM

## 2025-01-15 PROCEDURE — 99214 OFFICE O/P EST MOD 30 MIN: CPT | Performed by: NURSE PRACTITIONER

## 2025-01-15 PROCEDURE — G8417 CALC BMI ABV UP PARAM F/U: HCPCS | Performed by: NURSE PRACTITIONER

## 2025-01-15 PROCEDURE — G8427 DOCREV CUR MEDS BY ELIG CLIN: HCPCS | Performed by: NURSE PRACTITIONER

## 2025-01-15 PROCEDURE — 1036F TOBACCO NON-USER: CPT | Performed by: NURSE PRACTITIONER

## 2025-01-15 RX ORDER — PHENTERMINE HYDROCHLORIDE 37.5 MG/1
37.5 TABLET ORAL
Qty: 30 TABLET | Refills: 0 | Status: SHIPPED | OUTPATIENT
Start: 2025-01-15 | End: 2025-02-14

## 2025-01-15 NOTE — PROGRESS NOTES
Chief Complaint   Patient presents with    Weight Management     Med WM Adipex # 7         SUBJECTIVE:    HPI: Patient is here with complaints of: Monthly Adipex visit.    Obesity with a BMI of Body mass index is 49.93 kg/m²..    Current weight: 275 pounds    Weight change since last visit: +2.7 pounds (if pt failed to lose weight, cannot remain on medication).    Month 7 of being on Adipex.     Any new absolute contraindications (glaucoma, drug abuse, CAD, uncontrolled HTN, arrhythmias, stroke, CHF, hyperthyroidism, MAOI use, pregnant or breastfeeding) to being on medication: DENIES     Pt complains of the following side effects: DENIES    Current dietary measures: tracking calories about 1500; protein moderate amount; water intake good; diet off track recently per holiday season    Current exercise measures: limited    I have reviewed the patient's(pertinent information to this visit) medical history, family history(scanned in  the [facility] under \"patient questioner\"), social history and review of systems with the patient today in the office.          Past Surgical History:   Procedure Laterality Date    TUBAL LIGATION         History reviewed. No pertinent past medical history.    History reviewed. No pertinent family history.    Social History     Socioeconomic History    Marital status:      Spouse name: Not on file    Number of children: Not on file    Years of education: Not on file    Highest education level: Not on file   Occupational History    Not on file   Tobacco Use    Smoking status: Never    Smokeless tobacco: Not on file   Substance and Sexual Activity    Alcohol use: Yes     Alcohol/week: 0.0 standard drinks of alcohol     Types: 7 - 10 Cans of beer per week     Comment: occasionally    Drug use: No    Sexual activity: Not on file   Other Topics Concern    Not on file   Social History Narrative    Not on file     Social Determinants of Health     Financial Resource Strain: Not on file   Food

## 2025-01-16 RX ORDER — PHENTERMINE HYDROCHLORIDE 37.5 MG/1
37.5 TABLET ORAL DAILY
Qty: 30 TABLET | OUTPATIENT
Start: 2025-01-16

## 2025-02-17 ENCOUNTER — OFFICE VISIT (OUTPATIENT)
Dept: BARIATRICS/WEIGHT MGMT | Age: 40
End: 2025-02-17
Payer: COMMERCIAL

## 2025-02-17 VITALS
SYSTOLIC BLOOD PRESSURE: 104 MMHG | BODY MASS INDEX: 49.39 KG/M2 | DIASTOLIC BLOOD PRESSURE: 80 MMHG | HEART RATE: 100 BPM | WEIGHT: 268.4 LBS | OXYGEN SATURATION: 96 % | HEIGHT: 62 IN

## 2025-02-17 DIAGNOSIS — Z79.899 MEDICATION MANAGEMENT: ICD-10-CM

## 2025-02-17 DIAGNOSIS — E66.01 MORBID OBESITY WITH BMI OF 45.0-49.9, ADULT: Primary | ICD-10-CM

## 2025-02-17 PROCEDURE — G8417 CALC BMI ABV UP PARAM F/U: HCPCS | Performed by: NURSE PRACTITIONER

## 2025-02-17 PROCEDURE — 99214 OFFICE O/P EST MOD 30 MIN: CPT | Performed by: NURSE PRACTITIONER

## 2025-02-17 PROCEDURE — G8427 DOCREV CUR MEDS BY ELIG CLIN: HCPCS | Performed by: NURSE PRACTITIONER

## 2025-02-17 PROCEDURE — 1036F TOBACCO NON-USER: CPT | Performed by: NURSE PRACTITIONER

## 2025-02-17 RX ORDER — PHENTERMINE HYDROCHLORIDE 37.5 MG/1
37.5 TABLET ORAL
Qty: 30 TABLET | Refills: 0 | Status: SHIPPED | OUTPATIENT
Start: 2025-02-17 | End: 2025-03-19

## 2025-02-17 NOTE — PROGRESS NOTES
Chief Complaint   Patient presents with    Weight Management     Med WM Adipex #8         SUBJECTIVE:    HPI: Patient is here with complaints of: Monthly Adipex visit.    Obesity with a BMI of Body mass index is 48.7 kg/m²..    Current weight: 268 pounds    Weight change since last visit: -6.8 pounds (if pt failed to lose weight, cannot remain on medication).    Month 8 of being on Adipex.     Any new absolute contraindications (glaucoma, drug abuse, CAD, uncontrolled HTN, arrhythmias, stroke, CHF, hyperthyroidism, MAOI use, pregnant or breastfeeding) to being on medication: DENIES     Pt complains of the following side effects: DENIES    Current dietary measures: calories controlled most days; one higher calorie meal per week; protein and water intake good    Current exercise measures: limited    I have reviewed the patient's(pertinent information to this visit) medical history, family history(scanned in  the [facility] under \"patient questioner\"), social history and review of systems with the patient today in the office.          Past Surgical History:   Procedure Laterality Date    TUBAL LIGATION         History reviewed. No pertinent past medical history.    History reviewed. No pertinent family history.    Social History     Socioeconomic History    Marital status:      Spouse name: Not on file    Number of children: Not on file    Years of education: Not on file    Highest education level: Not on file   Occupational History    Not on file   Tobacco Use    Smoking status: Never    Smokeless tobacco: Not on file   Substance and Sexual Activity    Alcohol use: Yes     Alcohol/week: 0.0 standard drinks of alcohol     Types: 7 - 10 Cans of beer per week     Comment: occasionally    Drug use: No    Sexual activity: Not on file   Other Topics Concern    Not on file   Social History Narrative    Not on file     Social Determinants of Health     Financial Resource Strain: Not on file   Food Insecurity: Not on file

## 2025-03-19 ENCOUNTER — OFFICE VISIT (OUTPATIENT)
Dept: BARIATRICS/WEIGHT MGMT | Age: 40
End: 2025-03-19
Payer: COMMERCIAL

## 2025-03-19 VITALS
WEIGHT: 270.6 LBS | HEART RATE: 103 BPM | DIASTOLIC BLOOD PRESSURE: 76 MMHG | HEIGHT: 62 IN | BODY MASS INDEX: 49.8 KG/M2 | SYSTOLIC BLOOD PRESSURE: 116 MMHG | OXYGEN SATURATION: 97 %

## 2025-03-19 DIAGNOSIS — Z79.899 MEDICATION MANAGEMENT: ICD-10-CM

## 2025-03-19 DIAGNOSIS — E66.01 MORBID OBESITY WITH BMI OF 45.0-49.9, ADULT: Primary | ICD-10-CM

## 2025-03-19 PROCEDURE — 99214 OFFICE O/P EST MOD 30 MIN: CPT | Performed by: NURSE PRACTITIONER

## 2025-03-19 PROCEDURE — 1036F TOBACCO NON-USER: CPT | Performed by: NURSE PRACTITIONER

## 2025-03-19 PROCEDURE — G8427 DOCREV CUR MEDS BY ELIG CLIN: HCPCS | Performed by: NURSE PRACTITIONER

## 2025-03-19 PROCEDURE — G8417 CALC BMI ABV UP PARAM F/U: HCPCS | Performed by: NURSE PRACTITIONER

## 2025-03-19 RX ORDER — PHENTERMINE HYDROCHLORIDE 37.5 MG/1
37.5 TABLET ORAL
Qty: 30 TABLET | Refills: 0 | Status: SHIPPED | OUTPATIENT
Start: 2025-03-19 | End: 2025-04-18

## 2025-03-19 NOTE — PROGRESS NOTES
Chief Complaint   Patient presents with    Weight Management     Medication WM Visit - Adipex #9            SUBJECTIVE:    HPI: Patient is here with complaints of: Monthly Adipex visit.    Obesity with a BMI of Body mass index is 49.1 kg/m²..    Current weight: 270 pounds    Weight change since last visit: +2.2 pounds (if pt failed to lose weight, cannot remain on medication).    Month 9 of being on Adipex.     Any new absolute contraindications (glaucoma, drug abuse, CAD, uncontrolled HTN, arrhythmias, stroke, CHF, hyperthyroidism, MAOI use, pregnant or breastfeeding) to being on medication: DENIES     Pt complains of the following side effects: DENIES    Current dietary measures: calorie awareness; protein intake good;  water a gallon daily    Current exercise measures: walking 4 times a week     I have reviewed the patient's(pertinent information to this visit) medical history, family history(scanned in  the [facility] under \"patient questioner\"), social history and review of systems with the patient today in the office.          Past Surgical History:   Procedure Laterality Date    TUBAL LIGATION         History reviewed. No pertinent past medical history.    History reviewed. No pertinent family history.    Social History     Socioeconomic History    Marital status:      Spouse name: Not on file    Number of children: Not on file    Years of education: Not on file    Highest education level: Not on file   Occupational History    Not on file   Tobacco Use    Smoking status: Never    Smokeless tobacco: Not on file   Substance and Sexual Activity    Alcohol use: Yes     Alcohol/week: 0.0 standard drinks of alcohol     Types: 7 - 10 Cans of beer per week     Comment: occasionally    Drug use: No    Sexual activity: Not on file   Other Topics Concern    Not on file   Social History Narrative    Not on file     Social Drivers of Health     Financial Resource Strain: Not on file   Food Insecurity: Not on file

## 2025-04-16 ENCOUNTER — OFFICE VISIT (OUTPATIENT)
Dept: BARIATRICS/WEIGHT MGMT | Age: 40
End: 2025-04-16
Payer: COMMERCIAL

## 2025-04-16 VITALS
WEIGHT: 270.9 LBS | HEIGHT: 62 IN | DIASTOLIC BLOOD PRESSURE: 70 MMHG | SYSTOLIC BLOOD PRESSURE: 112 MMHG | HEART RATE: 92 BPM | OXYGEN SATURATION: 98 % | BODY MASS INDEX: 49.85 KG/M2

## 2025-04-16 DIAGNOSIS — E66.01 MORBID OBESITY WITH BMI OF 45.0-49.9, ADULT: Primary | ICD-10-CM

## 2025-04-16 DIAGNOSIS — Z79.899 MEDICATION MANAGEMENT: ICD-10-CM

## 2025-04-16 PROCEDURE — 99214 OFFICE O/P EST MOD 30 MIN: CPT | Performed by: NURSE PRACTITIONER

## 2025-04-16 PROCEDURE — G8427 DOCREV CUR MEDS BY ELIG CLIN: HCPCS | Performed by: NURSE PRACTITIONER

## 2025-04-16 PROCEDURE — 1036F TOBACCO NON-USER: CPT | Performed by: NURSE PRACTITIONER

## 2025-04-16 PROCEDURE — G8417 CALC BMI ABV UP PARAM F/U: HCPCS | Performed by: NURSE PRACTITIONER

## 2025-04-16 RX ORDER — PHENTERMINE HYDROCHLORIDE 37.5 MG/1
37.5 TABLET ORAL
Qty: 30 TABLET | Refills: 0 | Status: SHIPPED | OUTPATIENT
Start: 2025-04-16 | End: 2025-05-16

## 2025-04-16 NOTE — PROGRESS NOTES
mouth every morning (before breakfast) for 30 days. BMI 44. Max Daily Amount: 37.5 mg     Dispense:  30 tablet     Refill:  0        Follow Up:  Return in about 1 month (around 5/16/2025).      ROXANA Veliz - CNP

## 2025-05-14 ENCOUNTER — OFFICE VISIT (OUTPATIENT)
Dept: BARIATRICS/WEIGHT MGMT | Age: 40
End: 2025-05-14
Payer: COMMERCIAL

## 2025-05-14 VITALS
WEIGHT: 268.7 LBS | DIASTOLIC BLOOD PRESSURE: 78 MMHG | HEIGHT: 62 IN | BODY MASS INDEX: 49.45 KG/M2 | HEART RATE: 87 BPM | OXYGEN SATURATION: 90 % | SYSTOLIC BLOOD PRESSURE: 116 MMHG

## 2025-05-14 DIAGNOSIS — E66.01 MORBID OBESITY WITH BMI OF 45.0-49.9, ADULT (HCC): Primary | ICD-10-CM

## 2025-05-14 DIAGNOSIS — Z79.899 MEDICATION MANAGEMENT: ICD-10-CM

## 2025-05-14 PROCEDURE — 1036F TOBACCO NON-USER: CPT | Performed by: NURSE PRACTITIONER

## 2025-05-14 PROCEDURE — G8417 CALC BMI ABV UP PARAM F/U: HCPCS | Performed by: NURSE PRACTITIONER

## 2025-05-14 PROCEDURE — G8427 DOCREV CUR MEDS BY ELIG CLIN: HCPCS | Performed by: NURSE PRACTITIONER

## 2025-05-14 PROCEDURE — 99214 OFFICE O/P EST MOD 30 MIN: CPT | Performed by: NURSE PRACTITIONER

## 2025-05-14 RX ORDER — PHENTERMINE HYDROCHLORIDE 37.5 MG/1
37.5 TABLET ORAL
Qty: 30 TABLET | Refills: 0 | Status: SHIPPED | OUTPATIENT
Start: 2025-05-14 | End: 2025-06-13

## 2025-05-14 NOTE — PROGRESS NOTES
Chief Complaint   Patient presents with    Weight Management     Med WM Adipex # 11         SUBJECTIVE:    HPI: Patient is here with complaints of: Monthly Adipex visit.    Obesity with a BMI of Body mass index is 48.75 kg/m²..    Current weight: 268 pounds     Weight change since last visit: -2.2 pounds (if pt failed to lose weight, cannot remain on medication).    Month 11 of being on Adipex.     Any new absolute contraindications (glaucoma, drug abuse, CAD, uncontrolled HTN, arrhythmias, stroke, CHF, hyperthyroidism, MAOI use, pregnant or breastfeeding) to being on medication: DENIES     Pt complains of the following side effects: DENIES    Current dietary measures: gallon water daily; decreased calories to 1400; protein intake with each meal and snack    Current exercise measures: increasing activity    I have reviewed the patient's(pertinent information to this visit) medical history, family history(scanned in  the [facility] under \"patient questioner\"), social history and review of systems with the patient today in the office.          Past Surgical History:   Procedure Laterality Date    TUBAL LIGATION         No past medical history on file.    No family history on file.    Social History     Socioeconomic History    Marital status:      Spouse name: Not on file    Number of children: Not on file    Years of education: Not on file    Highest education level: Not on file   Occupational History    Not on file   Tobacco Use    Smoking status: Never    Smokeless tobacco: Not on file   Substance and Sexual Activity    Alcohol use: Yes     Alcohol/week: 0.0 standard drinks of alcohol     Types: 7 - 10 Cans of beer per week     Comment: occasionally    Drug use: No    Sexual activity: Not on file   Other Topics Concern    Not on file   Social History Narrative    Not on file     Social Drivers of Health     Financial Resource Strain: Not on file   Food Insecurity: Not on file   Transportation Needs: Not on

## 2025-06-09 ENCOUNTER — OFFICE VISIT (OUTPATIENT)
Dept: BARIATRICS/WEIGHT MGMT | Age: 40
End: 2025-06-09
Payer: COMMERCIAL

## 2025-06-09 VITALS
HEART RATE: 67 BPM | OXYGEN SATURATION: 97 % | BODY MASS INDEX: 50.59 KG/M2 | SYSTOLIC BLOOD PRESSURE: 118 MMHG | WEIGHT: 274.9 LBS | HEIGHT: 62 IN | DIASTOLIC BLOOD PRESSURE: 70 MMHG

## 2025-06-09 DIAGNOSIS — E66.01 MORBID OBESITY WITH BMI OF 45.0-49.9, ADULT (HCC): Primary | ICD-10-CM

## 2025-06-09 DIAGNOSIS — Z79.899 MEDICATION MANAGEMENT: ICD-10-CM

## 2025-06-09 PROCEDURE — G8417 CALC BMI ABV UP PARAM F/U: HCPCS | Performed by: NURSE PRACTITIONER

## 2025-06-09 PROCEDURE — 99214 OFFICE O/P EST MOD 30 MIN: CPT | Performed by: NURSE PRACTITIONER

## 2025-06-09 PROCEDURE — 1036F TOBACCO NON-USER: CPT | Performed by: NURSE PRACTITIONER

## 2025-06-09 PROCEDURE — G8427 DOCREV CUR MEDS BY ELIG CLIN: HCPCS | Performed by: NURSE PRACTITIONER

## 2025-06-09 RX ORDER — PHENTERMINE HYDROCHLORIDE 37.5 MG/1
37.5 TABLET ORAL
Qty: 30 TABLET | Refills: 0 | Status: SHIPPED | OUTPATIENT
Start: 2025-06-09 | End: 2025-07-09

## 2025-06-09 NOTE — PROGRESS NOTES
ASSESSMENT and PLAN:  1. Morbid obesity with BMI of 45.0-49.9, adult (HCC)  - Calorie goal  about  1300 daily  - Carb goal 60-70 total carbs daily  - Protein intake 60-80 gms daily  - Continue water intake daily  - Increase activity/ walking as able    2. Medication management  - Weight gain of 6.2 pounds this visit  - Patient normally has weight loss each visit  - Denies any side effects  - RX refill sent for 12th month of Adipex  - Will start weaning process next visit   - RTC one month    - phentermine (ADIPEX-P) 37.5 MG tablet; Take 1 tablet by mouth every morning (before breakfast) for 30 days. BMI 44. Max Daily Amount: 37.5 mg  Dispense: 30 tablet; Refill: 0          -Continue Adipex. RX REFILLED.    -BMI is over 30, or over 27 with weight-related risk factors.     -Patient demonstrated weight loss since last visit.    -Patient aware that weight must be lost at each visit or medication will be discontinued.     - Patient must lose 5% of body weight every 3 months to remain on medication.    -Patient is aware that in order to be successful, must combine Adipex with appropriate diet and exercise plan.     -Pt aware must continue to meet monthly in person while on this medication. F/u in one month.    -Check [place] Automated Rx Reporting System. Any concerns: NONE Reported     - Current birth control measures include: tubal ligation    -If elderly or renal impairment, will use lower dose (15 mg daily) and avoid all together if GFR is less than 15. N/A      No orders of the defined types were placed in this encounter.       Orders Placed This Encounter   Medications    phentermine (ADIPEX-P) 37.5 MG tablet     Sig: Take 1 tablet by mouth every morning (before breakfast) for 30 days. BMI 44. Max Daily Amount: 37.5 mg     Dispense:  30 tablet     Refill:  0        Follow Up:  Return in about 1 month (around 7/9/2025).      ROXANA Veliz - CNP

## 2025-07-21 ENCOUNTER — OFFICE VISIT (OUTPATIENT)
Dept: BARIATRICS/WEIGHT MGMT | Age: 40
End: 2025-07-21
Payer: COMMERCIAL

## 2025-07-21 VITALS
RESPIRATION RATE: 18 BRPM | HEART RATE: 103 BPM | HEIGHT: 62 IN | BODY MASS INDEX: 50.97 KG/M2 | OXYGEN SATURATION: 98 % | WEIGHT: 277 LBS | SYSTOLIC BLOOD PRESSURE: 124 MMHG | DIASTOLIC BLOOD PRESSURE: 72 MMHG

## 2025-07-21 DIAGNOSIS — E66.01 MORBID OBESITY WITH BMI OF 50.0-59.9, ADULT (HCC): Primary | ICD-10-CM

## 2025-07-21 DIAGNOSIS — Z79.899 MEDICATION MANAGEMENT: ICD-10-CM

## 2025-07-21 PROCEDURE — G8427 DOCREV CUR MEDS BY ELIG CLIN: HCPCS | Performed by: NURSE PRACTITIONER

## 2025-07-21 PROCEDURE — 1036F TOBACCO NON-USER: CPT | Performed by: NURSE PRACTITIONER

## 2025-07-21 PROCEDURE — G8417 CALC BMI ABV UP PARAM F/U: HCPCS | Performed by: NURSE PRACTITIONER

## 2025-07-21 PROCEDURE — 99214 OFFICE O/P EST MOD 30 MIN: CPT | Performed by: NURSE PRACTITIONER

## 2025-07-21 RX ORDER — PHENTERMINE HYDROCHLORIDE 37.5 MG/1
37.5 TABLET ORAL
Qty: 30 TABLET | Refills: 0 | Status: SHIPPED | OUTPATIENT
Start: 2025-07-21 | End: 2025-08-20

## 2025-07-21 NOTE — PROGRESS NOTES
Chief Complaint   Patient presents with    Weight Loss    Weight Management      F/U Medication WM Visit - Adiepx Weaning Has Bar Cov             SUBJECTIVE:    HPI: Patient is here with complaints of: Monthly Adipex visit.    Obesity with a BMI of Body mass index is 50.26 kg/m²..    Current weight: 277 pounds    Weight change since last visit: +2.1 pounds (if pt failed to lose weight, cannot remain on medication).    Month 13 of being on Adipex.     Any new absolute contraindications (glaucoma, drug abuse, CAD, uncontrolled HTN, arrhythmias, stroke, CHF, hyperthyroidism, MAOI use, pregnant or breastfeeding) to being on medication: DENIES     Pt complains of the following side effects: DENIES    Current dietary measures: increased stress and emotional eating; unsure of caloric and protein intake    Current exercise measures: limited    I have reviewed the patient's(pertinent information to this visit) medical history, family history(scanned in  the [facility] under \"patient questioner\"), social history and review of systems with the patient today in the office.          Past Surgical History:   Procedure Laterality Date    TUBAL LIGATION         No past medical history on file.    No family history on file.    Social History     Socioeconomic History    Marital status:      Spouse name: Not on file    Number of children: Not on file    Years of education: Not on file    Highest education level: Not on file   Occupational History    Not on file   Tobacco Use    Smoking status: Never    Smokeless tobacco: Not on file   Substance and Sexual Activity    Alcohol use: Yes     Alcohol/week: 0.0 standard drinks of alcohol     Types: 7 - 10 Cans of beer per week     Comment: occasionally    Drug use: No    Sexual activity: Not on file   Other Topics Concern    Not on file   Social History Narrative    Not on file     Social Drivers of Health     Financial Resource Strain: Not on file   Food Insecurity: Not on file

## 2025-07-29 ENCOUNTER — OFFICE VISIT (OUTPATIENT)
Age: 40
End: 2025-07-29
Payer: COMMERCIAL

## 2025-07-29 ENCOUNTER — HOSPITAL ENCOUNTER (OUTPATIENT)
Age: 40
Setting detail: SPECIMEN
Discharge: HOME | End: 2025-07-29
Payer: COMMERCIAL

## 2025-07-29 VITALS
BODY MASS INDEX: 47.05 KG/M2 | HEIGHT: 64 IN | WEIGHT: 275.6 LBS | SYSTOLIC BLOOD PRESSURE: 114 MMHG | DIASTOLIC BLOOD PRESSURE: 85 MMHG

## 2025-07-29 DIAGNOSIS — Z01.419 WOMEN'S ANNUAL ROUTINE GYNECOLOGICAL EXAMINATION: Primary | ICD-10-CM

## 2025-07-29 PROCEDURE — 87624 HPV HI-RISK TYP POOLED RSLT: CPT

## 2025-07-29 PROCEDURE — 99395 PREV VISIT EST AGE 18-39: CPT

## 2025-07-29 PROCEDURE — G0123 SCREEN CERV/VAG THIN LAYER: HCPCS

## 2025-07-29 RX ORDER — NAPROXEN 500 MG/1
500 TABLET ORAL 2 TIMES DAILY PRN
Qty: 60 TABLET | Refills: 0 | Status: SHIPPED | OUTPATIENT
Start: 2025-07-29

## 2025-07-29 SDOH — ECONOMIC STABILITY: FOOD INSECURITY: WITHIN THE PAST 12 MONTHS, YOU WORRIED THAT YOUR FOOD WOULD RUN OUT BEFORE YOU GOT MONEY TO BUY MORE.: NEVER TRUE

## 2025-07-29 SDOH — ECONOMIC STABILITY: FOOD INSECURITY: WITHIN THE PAST 12 MONTHS, THE FOOD YOU BOUGHT JUST DIDN'T LAST AND YOU DIDN'T HAVE MONEY TO GET MORE.: NEVER TRUE

## 2025-07-29 ASSESSMENT — PATIENT HEALTH QUESTIONNAIRE - PHQ9
SUM OF ALL RESPONSES TO PHQ QUESTIONS 1-9: 0
SUM OF ALL RESPONSES TO PHQ QUESTIONS 1-9: 0
2. FEELING DOWN, DEPRESSED OR HOPELESS: NOT AT ALL
1. LITTLE INTEREST OR PLEASURE IN DOING THINGS: NOT AT ALL
SUM OF ALL RESPONSES TO PHQ QUESTIONS 1-9: 0
SUM OF ALL RESPONSES TO PHQ QUESTIONS 1-9: 0

## 2025-07-29 NOTE — PROGRESS NOTES
Date: 7/29/2025   Name: Tiffani Ramey  YOB: 1985    Chief Complaint   Patient presents with    Annual Exam     Annual exam. Non-smoker No known h/o dvt.  LMP 7/9/25.Periods are regular.Patient is sexually active. Patient is on tubal ligation. Pap Smear was  7/2023 and results were negativeHPV negative. Patient has no complaints. Is requesting refills on naproxen.         The patient is a 39 y.o. female, No obstetric history on file. who presents for her annual exam.  Menses once monthly and regular. Dysmenorrhea has improved since weight loss. Taking naproxen occasionally for menstrual cramps. No other concerns.    History reviewed. No pertinent past medical history.  Past Surgical History:   Procedure Laterality Date    TUBAL LIGATION       No family history on file.  Social History     Tobacco Use    Smoking status: Never    Smokeless tobacco: Never   Substance Use Topics    Alcohol use: Yes     Alcohol/week: 0.0 standard drinks of alcohol     Types: 7 - 10 Cans of beer per week     Comment: occasionally    Drug use: No     Current Outpatient Medications   Medication Sig Dispense Refill    naproxen (NAPROSYN) 500 MG tablet Take 1 tablet by mouth 2 times daily as needed for Pain START 1 DAY BEFORE ONSET OF MENSES AND TAKE IT FOR 3-5 DAYS 60 tablet 0    phentermine (ADIPEX-P) 37.5 MG tablet Take 1 tablet by mouth every morning (before breakfast) for 30 days. BMI 44. Max Daily Amount: 37.5 mg 30 tablet 0    senna (SENOKOT) 8.6 MG tablet Take 1 tablet by mouth 2 times daily 60 tablet 11     No current facility-administered medications for this visit.     No Known Allergies  No obstetric history on file.    There is no immunization history on file for this patient.    Review of Systems  Review of Systems: The following ROS was otherwise negative, except as noted in the HPI: constitutional, HEENT, respiratory, cardiovascular, gastrointestinal, genitourinary, skin, musculoskeletal, neurological,

## 2025-07-31 LAB
COMMENT: NORMAL
HPV OTHER HR TYPES: NORMAL
HPV TYPE 16: NORMAL
HPV TYPE 18: NORMAL

## 2025-08-07 LAB
COMMENT: NORMAL
GYNECOLOGY CYTOLOGY REPORT: NORMAL
HPV OTHER HR TYPES: NOT DETECTED
HPV TYPE 16: NOT DETECTED
HPV TYPE 18: NOT DETECTED

## 2025-09-04 DIAGNOSIS — Z01.419 WOMEN'S ANNUAL ROUTINE GYNECOLOGICAL EXAMINATION: ICD-10-CM

## 2025-09-04 RX ORDER — NAPROXEN 500 MG/1
TABLET ORAL
Qty: 60 TABLET | Refills: 0 | Status: SHIPPED | OUTPATIENT
Start: 2025-09-04